# Patient Record
Sex: MALE | Race: BLACK OR AFRICAN AMERICAN | NOT HISPANIC OR LATINO | Employment: STUDENT | URBAN - METROPOLITAN AREA
[De-identification: names, ages, dates, MRNs, and addresses within clinical notes are randomized per-mention and may not be internally consistent; named-entity substitution may affect disease eponyms.]

---

## 2020-01-07 ENCOUNTER — TELEPHONE (OUTPATIENT)
Dept: FAMILY MEDICINE | Facility: CLINIC | Age: 20
End: 2020-01-07

## 2020-01-07 NOTE — TELEPHONE ENCOUNTER
----- Message from Hilda Fontenot sent at 1/7/2020  3:58 PM CST -----  Contact: Pt mother   Caller called in regards to scheduling a appointment. Pt can be reached at [phone] (home) . Caller also wanted to get the brother in as well.

## 2020-01-13 ENCOUNTER — OFFICE VISIT (OUTPATIENT)
Dept: FAMILY MEDICINE | Facility: CLINIC | Age: 20
End: 2020-01-13
Payer: MEDICAID

## 2020-01-13 ENCOUNTER — TELEPHONE (OUTPATIENT)
Dept: SPORTS MEDICINE | Facility: CLINIC | Age: 20
End: 2020-01-13

## 2020-01-13 VITALS
WEIGHT: 205.69 LBS | RESPIRATION RATE: 16 BRPM | HEART RATE: 82 BPM | BODY MASS INDEX: 27.26 KG/M2 | SYSTOLIC BLOOD PRESSURE: 112 MMHG | OXYGEN SATURATION: 99 % | DIASTOLIC BLOOD PRESSURE: 74 MMHG | HEIGHT: 73 IN | TEMPERATURE: 98 F

## 2020-01-13 DIAGNOSIS — Z23 NEED FOR INFLUENZA VACCINATION: ICD-10-CM

## 2020-01-13 DIAGNOSIS — S69.92XS FINGER INJURY, LEFT, SEQUELA: Primary | ICD-10-CM

## 2020-01-13 PROCEDURE — 99203 PR OFFICE/OUTPT VISIT, NEW, LEVL III, 30-44 MIN: ICD-10-PCS | Mod: 25,S$PBB,, | Performed by: FAMILY MEDICINE

## 2020-01-13 PROCEDURE — 99999 PR PBB SHADOW E&M-EST. PATIENT-LVL IV: ICD-10-PCS | Mod: PBBFAC,,, | Performed by: FAMILY MEDICINE

## 2020-01-13 PROCEDURE — 99214 OFFICE O/P EST MOD 30 MIN: CPT | Mod: PBBFAC,PO,25 | Performed by: FAMILY MEDICINE

## 2020-01-13 PROCEDURE — 99203 OFFICE O/P NEW LOW 30 MIN: CPT | Mod: 25,S$PBB,, | Performed by: FAMILY MEDICINE

## 2020-01-13 PROCEDURE — 90686 IIV4 VACC NO PRSV 0.5 ML IM: CPT | Mod: PBBFAC,PO

## 2020-01-13 PROCEDURE — 99999 PR PBB SHADOW E&M-EST. PATIENT-LVL IV: CPT | Mod: PBBFAC,,, | Performed by: FAMILY MEDICINE

## 2020-01-13 RX ORDER — MONTELUKAST SODIUM 10 MG/1
10 TABLET ORAL DAILY PRN
COMMUNITY
Start: 2019-10-21 | End: 2020-04-01 | Stop reason: SDUPTHER

## 2020-01-13 RX ORDER — CETIRIZINE HYDROCHLORIDE 10 MG/1
10 TABLET ORAL DAILY PRN
COMMUNITY
Start: 2019-10-17 | End: 2020-04-01 | Stop reason: SDUPTHER

## 2020-01-13 NOTE — TELEPHONE ENCOUNTER
Called in regards to ortho referral and spoke with pt's mother.  She scheduled apt and was informed to arrive 30 min early for x-ray.

## 2020-01-13 NOTE — PROGRESS NOTES
Samuel Adelso Espinal    Chief Complaint   Patient presents with    Establish Care    Finger Trauma       History of Present Illness:   Mr. Espinal comes in today as a new patient to establish care with me and for left finger injury evaluation.    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  He states in October 2019 while playing in a game he felt his left 5th finger pop with swelling.  He states he relocated his finger joint and taped it; however, he states he continues to have slight deformity of his finger and now has occasional pain with practicing ball. He denies numbness or tingling at his left hand. He states he did not have any medical attention for his finger injury until today.  He states he is right-handed.  He states he is scheduled to return to school on January 20, 2020.    Otherwise, he has no other problems today.    Past Medical History:  No date: Seasonal allergies      Current Outpatient Medications   Medication Sig    cetirizine (ZYRTEC) 10 MG tablet Take 10 mg by mouth daily as needed.     montelukast (SINGULAIR) 10 mg tablet Take 10 mg by mouth daily as needed.        Review of Systems   Constitutional: Negative for appetite change, chills, fatigue and fever.   Respiratory: Negative for cough, shortness of breath and wheezing.    Cardiovascular: Negative for chest pain, palpitations and leg swelling.   Gastrointestinal: Negative for abdominal pain, constipation, diarrhea, nausea and vomiting.   Genitourinary: Negative for difficulty urinating.   Musculoskeletal: Positive for joint swelling and myalgias. Negative for back pain.   Neurological: Negative for numbness and headaches.   Psychiatric/Behavioral: Negative for dysphoric mood and suicidal ideas. The patient is not nervous/anxious.         Negative for homicidal ideas.       Objective:  Physical Exam   Constitutional: He is oriented to person, place, and time. He appears well-developed and  well-nourished. No distress.   Pleasant.   Neck: Normal range of motion. Neck supple. No thyromegaly present.   Cardiovascular: Normal rate, regular rhythm, normal heart sounds and intact distal pulses.   No murmur heard.  Pulmonary/Chest: Effort normal and breath sounds normal. No stridor. No respiratory distress. He has no wheezes.   Abdominal: Soft. Bowel sounds are normal. He exhibits no distension and no mass. There is no tenderness. There is no guarding.   Musculoskeletal: Normal range of motion. He exhibits edema and deformity. He exhibits no tenderness.   He is ambulatory without problems.  Slightly swollen deformity at left 5th finger/PIP joint with full range of motion noted.   Lymphadenopathy:     He has no cervical adenopathy.   Neurological: He is alert and oriented to person, place, and time.   Skin: He is not diaphoretic.   Psychiatric: He has a normal mood and affect. His behavior is normal. Judgment and thought content normal.   Vitals reviewed.      ASSESSMENT:  1. Finger injury, left, sequela    2. Need for influenza vaccination        PLAN:  Samuel was seen today for establish care and finger trauma.    Diagnoses and all orders for this visit:    Finger injury, left, sequela  -     Ambulatory referral/consult to Orthopedics; Future    Need for influenza vaccination  -     Influenza - Quadrivalent (6 months+) (PF)    Continue current medications, follow low sodium, low cholesterol, low carb diet, daily walks.  Follow up if symptoms worsen or fail to improve.       Yes

## 2020-01-14 ENCOUNTER — OFFICE VISIT (OUTPATIENT)
Dept: ORTHOPEDICS | Facility: CLINIC | Age: 20
End: 2020-01-14
Payer: MEDICAID

## 2020-01-14 ENCOUNTER — HOSPITAL ENCOUNTER (OUTPATIENT)
Dept: RADIOLOGY | Facility: HOSPITAL | Age: 20
Discharge: HOME | End: 2020-01-14
Attending: ORTHOPAEDIC SURGERY
Payer: MEDICAID

## 2020-01-14 VITALS — HEIGHT: 73 IN | WEIGHT: 205 LBS | BODY MASS INDEX: 27.17 KG/M2

## 2020-01-14 DIAGNOSIS — M79.642 PAIN OF LEFT HAND: ICD-10-CM

## 2020-01-14 DIAGNOSIS — S69.92XS FINGER INJURY, LEFT, SEQUELA: ICD-10-CM

## 2020-01-14 DIAGNOSIS — M79.642 PAIN OF LEFT HAND: Primary | ICD-10-CM

## 2020-01-14 PROCEDURE — 73130 XR HAND COMPLETE 3 VIEW LEFT: ICD-10-PCS | Mod: 26,LT,, | Performed by: RADIOLOGY

## 2020-01-14 PROCEDURE — 99213 OFFICE O/P EST LOW 20 MIN: CPT | Mod: PBBFAC,25 | Performed by: ORTHOPAEDIC SURGERY

## 2020-01-14 PROCEDURE — 73130 X-RAY EXAM OF HAND: CPT | Mod: 26,LT,, | Performed by: RADIOLOGY

## 2020-01-14 PROCEDURE — 99203 PR OFFICE/OUTPT VISIT, NEW, LEVL III, 30-44 MIN: ICD-10-PCS | Mod: S$PBB,,, | Performed by: ORTHOPAEDIC SURGERY

## 2020-01-14 PROCEDURE — 99999 PR PBB SHADOW E&M-EST. PATIENT-LVL III: CPT | Mod: PBBFAC,,, | Performed by: ORTHOPAEDIC SURGERY

## 2020-01-14 PROCEDURE — 99999 PR PBB SHADOW E&M-EST. PATIENT-LVL III: ICD-10-PCS | Mod: PBBFAC,,, | Performed by: ORTHOPAEDIC SURGERY

## 2020-01-14 PROCEDURE — 99203 OFFICE O/P NEW LOW 30 MIN: CPT | Mod: S$PBB,,, | Performed by: ORTHOPAEDIC SURGERY

## 2020-01-14 PROCEDURE — 73130 X-RAY EXAM OF HAND: CPT | Mod: TC,LT

## 2020-01-14 NOTE — LETTER
January 14, 2020      Ebony Haile MD  [address]  [facility] [address]           [facility]  [address] [place] [address]  Phone: [phone]  Fax: [phone]          Patient: Samuel Espinal   MR Number: [id number]   YOB: 2000   Date of Visit: 1/14/2020       Dear Dr. Ebony Haile:    Thank you for referring Samuel Espinal to me for evaluation. Attached you will find relevant portions of my assessment and plan of care.    If you have questions, please do not hesitate to call me. I look forward to following Samuel Espinal along with you.    Sincerely,    Stevie Otto MD    Enclosure  CC:  No Recipients    If you would like to receive this communication electronically, please contact [email or web address] or [phone] to request more information on [facility] access.    For providers and/or their staff who would like to refer a patient to [facility], please contact us through our one-stop-shop provider referral line, [facility] , at [phone].    If you feel you have received this communication in error or would no longer like to receive these types of communications, please e-mail [email or web address]

## 2020-01-14 NOTE — PROGRESS NOTES
Subjective:     Patient ID: Samuel Espinal is a 19 y.o. male.    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  He goes to school in [place] and plays linebacker and is unavailable for follow-up.  He has not sought medical attention previously.      Past Medical History:   Diagnosis Date    Seasonal allergies      Past Surgical History:   Procedure Laterality Date    None       Family History   Problem Relation Age of Onset    No Known Problems Mother     Diabetes Father     No Known Problems Brother     No Known Problems Brother      Social History     Socioeconomic History    Marital status: Single     Spouse name: Not on file    Number of children: Not on file    Years of education: Not on file    Highest education level: Not on file   Occupational History    Not on file   Social Needs    Financial resource strain: Not on file    Food insecurity:     Worry: Not on file     Inability: Not on file    Transportation needs:     Medical: Not on file     Non-medical: Not on file   Tobacco Use    Smoking status: Never Smoker    Smokeless tobacco: Never Used   Substance and Sexual Activity    Alcohol use: Never     Frequency: Never    Drug use: Never    Sexual activity: Yes     Partners: Female     Birth control/protection: Condom   Lifestyle    Physical activity:     Days per week: Not on file     Minutes per session: Not on file    Stress: Not on file   Relationships    Social connections:     Talks on phone: Not on file     Gets together: Not on file     Attends Taoism service: Not on file     Active member of club or organization: Not on file     Attends meetings of clubs or organizations: Not on file     Relationship status: Not on file   Other Topics Concern    Not on file   Social History Narrative    Not on file     Medication List  with Changes/Refills   Current Medications    CETIRIZINE (ZYRTEC) 10 MG TABLET    Take 10 mg by mouth daily as needed.     MONTELUKAST (SINGULAIR) 10 MG TABLET    Take 10 mg by mouth daily as needed.      Review of patient's allergies indicates:  No Known Allergies  Review of Systems   Constitution: Negative for malaise/fatigue.   HENT: Negative for hearing loss.    Eyes: Negative for double vision and visual disturbance.   Cardiovascular: Negative for chest pain.   Respiratory: Negative for shortness of breath.    Endocrine: Negative for cold intolerance.   Hematologic/Lymphatic: Does not bruise/bleed easily.   Skin: Negative for poor wound healing and suspicious lesions.   Musculoskeletal: Negative for gout, joint pain and joint swelling.   Gastrointestinal: Negative for nausea and vomiting.   Genitourinary: Negative for dysuria.   Neurological: Negative for numbness, paresthesias and sensory change.   Psychiatric/Behavioral: Negative for depression, memory loss and substance abuse. The patient is not nervous/anxious.    Allergic/Immunologic: Negative for persistent infections.       Objective:   Body mass index is 27.05 kg/m².  There were no vitals filed for this visit.             General    Constitutional: He is oriented to person, place, and time. He appears well-developed and well-nourished. No distress.   HENT:   Head: Normocephalic.   Eyes: EOM are normal.   Neck: Normal range of motion.   Pulmonary/Chest: Effort normal.   Neurological: He is oriented to person, place, and time.   Psychiatric: He has a normal mood and affect.         Left Hand/Wrist Exam     Inspection   Scars: Hand -  absent  Effusion: Hand -  absent  Deformity: Hand -  present    Other     Sensory Exam  Median Distribution: normal  Ulnar Distribution: normal  Radial Distribution: normal    Comments:  The patient has -15 to 90° flexion left small finger PIP joint.  There is no instability on stressing the collateral ligaments are volar  plate.  There are no motor or sensory deficits.          Vascular Exam       Capillary Refill  Left Hand: normal capillary refill         radiographs left small finger were normal.  Assessment:     Encounter Diagnosis   Name Primary?    Finger injury, left, sequela     left small finger dislocation PIP joint, self reduced with residual flexion contracture PIP joint    Plan:       The patient was given a dynamic extensor assist splint.  He is going back to [place] and he needs to follow up there with physical therapy per his football team.  He will work on range-of-motion exercises.  He can return on a as needed basis to see me              Disclaimer: This note was prepared using a voice recognition system and is likely to have sound alike errors within the text.

## 2020-01-30 PROBLEM — S69.92XS FINGER INJURY, LEFT, SEQUELA: Status: ACTIVE | Noted: 2020-01-30

## 2020-04-01 ENCOUNTER — TELEPHONE (OUTPATIENT)
Dept: FAMILY MEDICINE | Facility: CLINIC | Age: 20
End: 2020-04-01

## 2020-04-01 RX ORDER — MONTELUKAST SODIUM 10 MG/1
10 TABLET ORAL DAILY PRN
Qty: 90 TABLET | Refills: 1 | Status: SHIPPED | OUTPATIENT
Start: 2020-04-01 | End: 2020-10-15 | Stop reason: SDUPTHER

## 2020-04-01 RX ORDER — CETIRIZINE HYDROCHLORIDE 10 MG/1
10 TABLET ORAL DAILY PRN
Qty: 90 TABLET | Refills: 1 | Status: SHIPPED | OUTPATIENT
Start: 2020-04-01 | End: 2020-10-15 | Stop reason: SDUPTHER

## 2020-04-01 RX ORDER — KETOTIFEN FUMARATE 0.35 MG/ML
SOLUTION/ DROPS OPHTHALMIC
Qty: 10 ML | Refills: 0 | COMMUNITY
Start: 2020-04-01 | End: 2020-04-02 | Stop reason: SDUPTHER

## 2020-04-01 RX ORDER — KETOTIFEN FUMARATE 0.35 MG/ML
SOLUTION/ DROPS OPHTHALMIC
COMMUNITY
Start: 2018-04-05 | End: 2020-04-01 | Stop reason: SDUPTHER

## 2020-04-02 RX ORDER — FLUTICASONE PROPIONATE 50 MCG
2 SPRAY, SUSPENSION (ML) NASAL DAILY PRN
Qty: 16 G | Refills: 2 | Status: SHIPPED | OUTPATIENT
Start: 2020-04-02 | End: 2020-10-15 | Stop reason: SDUPTHER

## 2020-04-02 RX ORDER — KETOTIFEN FUMARATE 0.35 MG/ML
SOLUTION/ DROPS OPHTHALMIC
Qty: 10 ML | Refills: 0 | Status: SHIPPED | OUTPATIENT
Start: 2020-04-02 | End: 2022-07-05

## 2020-04-02 NOTE — TELEPHONE ENCOUNTER
Eye drops was listed as OTC, patient mother wants rx.  Patient mother states Samuel also need Flonase

## 2020-04-02 NOTE — TELEPHONE ENCOUNTER
----- Message from Liz Shrestha sent at 4/2/2020  8:42 AM CDT -----  Type:  Needs Medical Advice    Who Called:  Pt Mom  (Deepti)  Symptoms (please be specific):    Calling regarding pt's prescriptions   How long has patient had these symptoms:     Pharmacy name and phone #:   [facility]//[address] in [place]  Would the patient rather a call back or a response via [facility]?   Call back  Best Call Back Number:    [phone]  Additional Information:  The pharmacy did not get the scripts for Flonase nasal spray and the eye drops//please call//thanks/esther

## 2020-06-25 ENCOUNTER — TELEPHONE (OUTPATIENT)
Dept: FAMILY MEDICINE | Facility: CLINIC | Age: 20
End: 2020-06-25

## 2020-06-25 NOTE — TELEPHONE ENCOUNTER
----- Message from Alirio Ackerman sent at 6/25/2020 11:49 AM CDT -----  Pt's mother would like to know date of last Tetanus shot. Please call back at [phone].  Md Ray

## 2020-06-25 NOTE — TELEPHONE ENCOUNTER
----- Message from Ebony Erwin sent at 6/25/2020  1:00 PM CDT -----  Regarding: returned call  Contact: Patients mother  Returned call, please call [phone]

## 2020-10-15 RX ORDER — CETIRIZINE HYDROCHLORIDE 10 MG/1
10 TABLET ORAL DAILY PRN
Qty: 90 TABLET | Refills: 1 | Status: SHIPPED | OUTPATIENT
Start: 2020-10-15 | End: 2020-12-15 | Stop reason: SDUPTHER

## 2020-10-15 RX ORDER — FLUTICASONE PROPIONATE 50 MCG
2 SPRAY, SUSPENSION (ML) NASAL DAILY PRN
Qty: 16 G | Refills: 2 | Status: SHIPPED | OUTPATIENT
Start: 2020-10-15 | End: 2020-12-15 | Stop reason: SDUPTHER

## 2020-10-15 RX ORDER — MONTELUKAST SODIUM 10 MG/1
10 TABLET ORAL DAILY PRN
Qty: 90 TABLET | Refills: 1 | Status: SHIPPED | OUTPATIENT
Start: 2020-10-15 | End: 2020-12-15 | Stop reason: SDUPTHER

## 2020-10-15 NOTE — TELEPHONE ENCOUNTER
----- Message from Selina Leon sent at 10/15/2020  8:38 AM CDT -----  Regarding: refill  Contact: brown gala / mother  Caller is requesting a call back regarding his medications refill.  Please call back at [phone].  Thanks.    [employer]  [facility]# [phone]

## 2020-10-15 NOTE — TELEPHONE ENCOUNTER
Patient mother states he is in school and need meds sent to [facility] in [place]. Pharmacy updated.

## 2020-11-24 ENCOUNTER — TELEPHONE (OUTPATIENT)
Dept: FAMILY MEDICINE | Facility: CLINIC | Age: 20
End: 2020-11-24

## 2020-11-24 NOTE — TELEPHONE ENCOUNTER
----- Message from Lesley Luong sent at 11/24/2020 10:10 AM CST -----  Regarding: Patient Call Back  Who Called: Mrs. Espinal (Mother)    What is the request in detail: Would like a call back in regards to finding out if her son has had a meningitis shot.     Can the clinic reply by  [facility]? No    Best Call Back Number: [phone]

## 2020-11-24 NOTE — TELEPHONE ENCOUNTER
Spoke with pt and mom, requesting vaccine record to be faxed over to the pt's school in [place] at(fax [phone], ph [phone]). Records faxed over.

## 2020-12-04 ENCOUNTER — TELEPHONE (OUTPATIENT)
Dept: FAMILY MEDICINE | Facility: CLINIC | Age: 20
End: 2020-12-04

## 2020-12-04 DIAGNOSIS — L73.8 FOLLICULITIS BARBAE: Primary | ICD-10-CM

## 2020-12-04 NOTE — TELEPHONE ENCOUNTER
Pt requesting referral for dermatologist  For hair under his neck (beared) is turn into sores and big bumps .

## 2020-12-04 NOTE — TELEPHONE ENCOUNTER
----- Message from Tiffany Perez sent at 12/4/2020  4:21 PM CST -----  Contact: Mother  Per the pt mother please send a referral to dermatologist Dr.Tara Dinero phone# [phone], no additional info given and can be reached at [phone]///thxMW

## 2020-12-09 ENCOUNTER — TELEPHONE (OUTPATIENT)
Dept: FAMILY MEDICINE | Facility: CLINIC | Age: 20
End: 2020-12-09

## 2020-12-09 NOTE — TELEPHONE ENCOUNTER
----- Message from Tiffany Perez sent at 12/9/2020  8:51 AM CST -----  Contact: pt  Request a call concerning a referral to dermatology for this pt, no additional info given and can be reached at  [phone]///thxMW

## 2020-12-09 NOTE — TELEPHONE ENCOUNTER
Patient asking for derm referral to dr Sherri read   At [facility]     For acne on neck around beard

## 2020-12-15 RX ORDER — CETIRIZINE HYDROCHLORIDE 10 MG/1
10 TABLET ORAL DAILY PRN
Qty: 90 TABLET | Refills: 0 | Status: SHIPPED | OUTPATIENT
Start: 2020-12-15

## 2020-12-15 RX ORDER — FLUTICASONE PROPIONATE 50 MCG
2 SPRAY, SUSPENSION (ML) NASAL DAILY PRN
Qty: 16 G | Refills: 2 | Status: SHIPPED | OUTPATIENT
Start: 2020-12-15

## 2020-12-15 RX ORDER — MONTELUKAST SODIUM 10 MG/1
10 TABLET ORAL DAILY PRN
Qty: 90 TABLET | Refills: 0 | Status: SHIPPED | OUTPATIENT
Start: 2020-12-15

## 2020-12-15 NOTE — TELEPHONE ENCOUNTER
----- Message from Leslie Darden sent at 12/15/2020  1:30 PM CST -----  Regarding: refill  Contact: Mother  What is the name of the medication you are requesting? Montelukast SOD  What is the dose? 10mg  How do you take the medication? Orally, Topically, etc?.  How often do you take this medication? Once at bedtime  Do you need a 30 day or 90 day supply?.  How many refills are you requesting?  What is your preferred pharmacy and location of pharmacy? [facility]  Who can we contact with further questions? Deepti [facility] [phone]    What is the name of the medication you are requesting? Cetirizine HCL  What is the dose? 10mg  How do you take the medication? Orally, Topically, etc?  How often do you take this medication? Once a day  Do you need a 30 day or 90 day supply?  How many refills are you requesting?  What is your preferred pharmacy and location of pharmacy? same  Who can we contact with further questions? [facility]    What is the name of the medication you are requesting? Nasal spray  What is the dose?  How do you take the medication? Orally, Topically, etc?  How often do you take this medication?  Do you need a 30 day or 90 day supply?  How many refills are you requesting?  What is your preferred pharmacy and location of pharmacy?  Who can we contact with further questions?

## 2021-07-28 ENCOUNTER — IMMUNIZATION (OUTPATIENT)
Dept: PHARMACY | Facility: CLINIC | Age: 21
End: 2021-07-28
Payer: MEDICAID

## 2021-07-28 DIAGNOSIS — Z23 NEED FOR VACCINATION: Primary | ICD-10-CM

## 2022-05-26 ENCOUNTER — PATIENT OUTREACH (OUTPATIENT)
Dept: ADMINISTRATIVE | Facility: OTHER | Age: 22
End: 2022-05-26
Payer: MEDICAID

## 2022-06-29 ENCOUNTER — TELEPHONE (OUTPATIENT)
Dept: FAMILY MEDICINE | Facility: CLINIC | Age: 22
End: 2022-06-29
Payer: MEDICAID

## 2022-06-29 NOTE — TELEPHONE ENCOUNTER
----- Message from Alison Justin sent at 6/27/2022 11:32 AM CDT -----  Contact: mother (Deepti)-[phone]  Type:  Same Day Appointment Request    Caller is requesting a same day appointment.  Caller declined first available appointment listed below.    Name of Caller:mother  When is the first available appointment?2022  Symptoms:stomp his toe  Best Call Back Number:[phone]  Additional Information:

## 2022-07-05 ENCOUNTER — OFFICE VISIT (OUTPATIENT)
Dept: FAMILY MEDICINE | Facility: CLINIC | Age: 22
End: 2022-07-05
Payer: MEDICAID

## 2022-07-05 VITALS
SYSTOLIC BLOOD PRESSURE: 116 MMHG | HEIGHT: 73 IN | TEMPERATURE: 96 F | BODY MASS INDEX: 26.97 KG/M2 | OXYGEN SATURATION: 98 % | HEART RATE: 69 BPM | WEIGHT: 203.5 LBS | DIASTOLIC BLOOD PRESSURE: 70 MMHG

## 2022-07-05 DIAGNOSIS — L30.9 DERMATITIS OF BOTH FEET: Primary | ICD-10-CM

## 2022-07-05 PROCEDURE — 3074F PR MOST RECENT SYSTOLIC BLOOD PRESSURE < 130 MM HG: ICD-10-PCS | Mod: CPTII,,, | Performed by: FAMILY MEDICINE

## 2022-07-05 PROCEDURE — 99214 OFFICE O/P EST MOD 30 MIN: CPT | Mod: PBBFAC,PO | Performed by: FAMILY MEDICINE

## 2022-07-05 PROCEDURE — 99213 OFFICE O/P EST LOW 20 MIN: CPT | Mod: S$PBB,,, | Performed by: FAMILY MEDICINE

## 2022-07-05 PROCEDURE — 1159F MED LIST DOCD IN RCRD: CPT | Mod: CPTII,,, | Performed by: FAMILY MEDICINE

## 2022-07-05 PROCEDURE — 99999 PR PBB SHADOW E&M-EST. PATIENT-LVL IV: ICD-10-PCS | Mod: PBBFAC,,, | Performed by: FAMILY MEDICINE

## 2022-07-05 PROCEDURE — 3078F PR MOST RECENT DIASTOLIC BLOOD PRESSURE < 80 MM HG: ICD-10-PCS | Mod: CPTII,,, | Performed by: FAMILY MEDICINE

## 2022-07-05 PROCEDURE — 1159F PR MEDICATION LIST DOCUMENTED IN MEDICAL RECORD: ICD-10-PCS | Mod: CPTII,,, | Performed by: FAMILY MEDICINE

## 2022-07-05 PROCEDURE — 3074F SYST BP LT 130 MM HG: CPT | Mod: CPTII,,, | Performed by: FAMILY MEDICINE

## 2022-07-05 PROCEDURE — 3078F DIAST BP <80 MM HG: CPT | Mod: CPTII,,, | Performed by: FAMILY MEDICINE

## 2022-07-05 PROCEDURE — 99213 PR OFFICE/OUTPT VISIT, EST, LEVL III, 20-29 MIN: ICD-10-PCS | Mod: S$PBB,,, | Performed by: FAMILY MEDICINE

## 2022-07-05 PROCEDURE — 3008F BODY MASS INDEX DOCD: CPT | Mod: CPTII,,, | Performed by: FAMILY MEDICINE

## 2022-07-05 PROCEDURE — 3008F PR BODY MASS INDEX (BMI) DOCUMENTED: ICD-10-PCS | Mod: CPTII,,, | Performed by: FAMILY MEDICINE

## 2022-07-05 PROCEDURE — 99999 PR PBB SHADOW E&M-EST. PATIENT-LVL IV: CPT | Mod: PBBFAC,,, | Performed by: FAMILY MEDICINE

## 2022-07-05 NOTE — PROGRESS NOTES
Samuel Adelso Espinal    Chief Complaint   Patient presents with    Foot Pain       History of Present Illness:   Mr. Espinal comes in today with complaint of having feet problems and requests referral to see podiatrist Dr. Miki Pabon.  He states he works out and runs a lot with subsequent feet soreness/pain along with blisters and skin dryness at his feet.    Otherwise, he denies having fever, chills, fatigue, appetite changes; shortness of breath, cough, wheezing; chest pain, palpitations, leg swelling abdominal pain, nausea, vomiting, diarrhea, constipation; unusual urinary symptoms; polydipsia, polyphagia, polyuria, hot or cold intolerance; back pain; headache, numbness; anxiety, depression, homicidal or suicidal thoughts.          Current Outpatient Medications   Medication Sig    cetirizine (ZYRTEC) 10 MG tablet Take 1 tablet (10 mg total) by mouth daily as needed.    fluticasone propionate (FLONASE) 50 mcg/actuation nasal spray 2 sprays (100 mcg total) by Each Nostril route daily as needed for Rhinitis.    montelukast (SINGULAIR) 10 mg tablet Take 1 tablet (10 mg total) by mouth daily as needed.       Review of Systems   Constitutional: Negative for appetite change, chills, fatigue and fever.   Respiratory: Negative for cough, shortness of breath and wheezing.    Cardiovascular: Negative for chest pain, palpitations and leg swelling.   Gastrointestinal: Negative for constipation, diarrhea, nausea and vomiting.   Endocrine: Negative for cold intolerance, heat intolerance, polydipsia, polyphagia and polyuria.   Genitourinary: Negative for difficulty urinating.   Musculoskeletal: Positive for myalgias. Negative for arthralgias and back pain.   Skin:        See history of present illness.   Neurological: Negative for numbness and headaches.   Psychiatric/Behavioral: Negative for dysphoric mood and suicidal ideas. The patient is not nervous/anxious.         Negative for homicidal ideas.        Objective:  Physical Exam  Vitals reviewed.   Constitutional:       General: He is not in acute distress.     Appearance: Normal appearance. He is not ill-appearing, toxic-appearing or diaphoretic.      Comments: Pleasant.   Cardiovascular:      Rate and Rhythm: Normal rate and regular rhythm.      Pulses:           Dorsalis pedis pulses are 3+ on the right side and 3+ on the left side.      Heart sounds: No murmur heard.  Pulmonary:      Effort: Pulmonary effort is normal. No respiratory distress.      Breath sounds: Normal breath sounds. No wheezing.   Abdominal:      General: Bowel sounds are normal. There is no distension.      Palpations: Abdomen is soft. There is no mass.      Tenderness: There is no abdominal tenderness. There is no guarding or rebound.   Musculoskeletal:         General: No swelling or tenderness. Normal range of motion.      Cervical back: Normal range of motion and neck supple. No tenderness.      Comments: He is ambulatory without problems.   Feet:      Right foot:      Protective Sensation: 5 sites tested. 5 sites sensed.      Skin integrity: Blister and dry skin present.      Toenail Condition: Right toenails are abnormally thick.      Left foot:      Protective Sensation: 5 sites tested. 5 sites sensed.      Skin integrity: Blister and dry skin present.      Comments: Thickened nail at right great toe. Dried skin and dried blisters at bottom of feet.  Lymphadenopathy:      Cervical: No cervical adenopathy.   Skin:     General: Skin is warm.   Neurological:      General: No focal deficit present.      Mental Status: He is alert and oriented to person, place, and time.   Psychiatric:         Mood and Affect: Mood normal.         Behavior: Behavior normal.         Thought Content: Thought content normal.         Judgment: Judgment normal.         ASSESSMENT:  1. Dermatitis of both feet        PLAN:  Samuel was seen today for foot pain.    Diagnoses and all orders for this  visit:    Dermatitis of both feet  -     Ambulatory referral/consult to Podiatry; Future       Continue current medications, follow low sodium, low cholesterol, low carb diet, daily walks.  Keep follow up with specialists.  Flu shot this fall.  Follow up if symptoms worsen or fail to improve.

## 2022-07-15 ENCOUNTER — TELEPHONE (OUTPATIENT)
Dept: FAMILY MEDICINE | Facility: CLINIC | Age: 22
End: 2022-07-15
Payer: MEDICAID

## 2022-07-15 NOTE — TELEPHONE ENCOUNTER
----- Message from Anna Gorman sent at 7/15/2022 10:30 AM CDT -----  Regarding: Immunization records  Contact: Patient & Mother  Please call patient concerning getting a copy of his immunization records emailed to him at  [phone] or [phone] ( )

## 2023-01-03 ENCOUNTER — TELEPHONE (OUTPATIENT)
Dept: FAMILY MEDICINE | Facility: CLINIC | Age: 23
End: 2023-01-03
Payer: MEDICAID

## 2023-01-03 NOTE — TELEPHONE ENCOUNTER
----- Message from Tristian Joe sent at 1/3/2023  9:43 AM CST -----  Contact: Mom  ..Type:  Sooner Apoointment Request    Caller is requesting a sooner appointment.  Caller declined first available appointment listed below.  Caller will not accept being placed on the waitlist and is requesting a message be sent to doctor.  Name of Caller:.Samuel Espinal  When is the first available appointment?  Symptoms: issues/concern   Would the patient rather a call back or a response via [facility]? Call back   Best Call Back Number: .[phone] (home)   Additional Information:

## 2023-01-25 ENCOUNTER — TELEPHONE (OUTPATIENT)
Dept: FAMILY MEDICINE | Facility: CLINIC | Age: 23
End: 2023-01-25
Payer: MEDICAID

## 2023-01-25 NOTE — TELEPHONE ENCOUNTER
----- Message from Dustin Sy sent at 1/25/2023  8:04 AM CST -----  Contact: Deepti/mom  Deepti is requesting a call to schedule an appt. Please call her back at [phone].          Thanks  DD

## 2023-01-25 NOTE — TELEPHONE ENCOUNTER
"Pt moms states he is complaining of issues "down there" and needs to get an appt. He is medicaid and I cant schedule it   "

## 2023-01-27 ENCOUNTER — TELEPHONE (OUTPATIENT)
Dept: FAMILY MEDICINE | Facility: CLINIC | Age: 23
End: 2023-01-27

## 2023-01-27 NOTE — TELEPHONE ENCOUNTER
----- Message from Shelby Griggs sent at 1/27/2023  8:36 AM CST -----  Contact: Deepti/Mom  Type:  Sooner Apoointment Request    Caller is requesting a sooner appointment. Caller will not accept being placed on the waitlist and is requesting a message be sent to doctor.  Name of Caller:Deepti  When is the first available appointment?unknown  Symptoms:problems with private area  Would the patient rather a call back or a response via [facility]? call  Best Call Back Number:[phone]    Additional Information: Patient's mom reports not yet receiving a response regarding scheduling the patient's appointment. Ms. Tatum request to schedule the patient an appointment sooner than next available.   Thank you,  GH

## 2023-01-27 NOTE — TELEPHONE ENCOUNTER
Called pt. Mother states she is at work currently and unable to get here. Wants to know if it's possible to fit him in on a Tuesday or Wednesday.

## 2023-02-20 ENCOUNTER — OFFICE VISIT (OUTPATIENT)
Dept: FAMILY MEDICINE | Facility: CLINIC | Age: 23
End: 2023-02-20
Payer: MEDICAID

## 2023-02-20 VITALS
TEMPERATURE: 99 F | WEIGHT: 213.38 LBS | RESPIRATION RATE: 18 BRPM | DIASTOLIC BLOOD PRESSURE: 78 MMHG | BODY MASS INDEX: 28.28 KG/M2 | HEIGHT: 73 IN | HEART RATE: 72 BPM | SYSTOLIC BLOOD PRESSURE: 134 MMHG | OXYGEN SATURATION: 98 %

## 2023-02-20 DIAGNOSIS — B35.6 TINEA CRURIS: Primary | ICD-10-CM

## 2023-02-20 DIAGNOSIS — L73.8 FOLLICULITIS BARBAE: ICD-10-CM

## 2023-02-20 DIAGNOSIS — E66.3 OVERWEIGHT (BMI 25.0-29.9): ICD-10-CM

## 2023-02-20 PROCEDURE — 3075F PR MOST RECENT SYSTOLIC BLOOD PRESS GE 130-139MM HG: ICD-10-PCS | Mod: CPTII,,, | Performed by: FAMILY MEDICINE

## 2023-02-20 PROCEDURE — 3078F DIAST BP <80 MM HG: CPT | Mod: CPTII,,, | Performed by: FAMILY MEDICINE

## 2023-02-20 PROCEDURE — 99999 PR PBB SHADOW E&M-EST. PATIENT-LVL III: CPT | Mod: PBBFAC,,, | Performed by: FAMILY MEDICINE

## 2023-02-20 PROCEDURE — 3008F BODY MASS INDEX DOCD: CPT | Mod: CPTII,,, | Performed by: FAMILY MEDICINE

## 2023-02-20 PROCEDURE — 99999 PR PBB SHADOW E&M-EST. PATIENT-LVL III: ICD-10-PCS | Mod: PBBFAC,,, | Performed by: FAMILY MEDICINE

## 2023-02-20 PROCEDURE — 1160F RVW MEDS BY RX/DR IN RCRD: CPT | Mod: CPTII,,, | Performed by: FAMILY MEDICINE

## 2023-02-20 PROCEDURE — 99213 PR OFFICE/OUTPT VISIT, EST, LEVL III, 20-29 MIN: ICD-10-PCS | Mod: S$PBB,,, | Performed by: FAMILY MEDICINE

## 2023-02-20 PROCEDURE — 99213 OFFICE O/P EST LOW 20 MIN: CPT | Mod: S$PBB,,, | Performed by: FAMILY MEDICINE

## 2023-02-20 PROCEDURE — 3075F SYST BP GE 130 - 139MM HG: CPT | Mod: CPTII,,, | Performed by: FAMILY MEDICINE

## 2023-02-20 PROCEDURE — 99213 OFFICE O/P EST LOW 20 MIN: CPT | Mod: PBBFAC,PO | Performed by: FAMILY MEDICINE

## 2023-02-20 PROCEDURE — 1160F PR REVIEW ALL MEDS BY PRESCRIBER/CLIN PHARMACIST DOCUMENTED: ICD-10-PCS | Mod: CPTII,,, | Performed by: FAMILY MEDICINE

## 2023-02-20 PROCEDURE — 3078F PR MOST RECENT DIASTOLIC BLOOD PRESSURE < 80 MM HG: ICD-10-PCS | Mod: CPTII,,, | Performed by: FAMILY MEDICINE

## 2023-02-20 PROCEDURE — 1159F PR MEDICATION LIST DOCUMENTED IN MEDICAL RECORD: ICD-10-PCS | Mod: CPTII,,, | Performed by: FAMILY MEDICINE

## 2023-02-20 PROCEDURE — 3008F PR BODY MASS INDEX (BMI) DOCUMENTED: ICD-10-PCS | Mod: CPTII,,, | Performed by: FAMILY MEDICINE

## 2023-02-20 PROCEDURE — 1159F MED LIST DOCD IN RCRD: CPT | Mod: CPTII,,, | Performed by: FAMILY MEDICINE

## 2023-02-20 RX ORDER — CLOTRIMAZOLE AND BETAMETHASONE DIPROPIONATE 10; .64 MG/G; MG/G
CREAM TOPICAL
Qty: 45 G | Refills: 0 | Status: SHIPPED | OUTPATIENT
Start: 2023-02-20

## 2023-02-20 NOTE — PROGRESS NOTES
Samuel Adelso Espinal    Chief Complaint   Patient presents with    Rash       History of Present Illness:   Mr. Espinal comes in today with complaint of having intermittent itchy rash in his genital area for approximately 6 months.  He states he is currently not sexually active and states rash did not start during time of sexual activity.    He also complains of having bumps in his chin area.  He states he shaves.  He states he desires to see Dermatology.    He states he occasionally drinks alcohol but denies tobacco or illicit drug use.    He denies having fever, chills, fatigue, appetite changes; shortness of breath, cough, wheezing; chest pain, palpitations, leg swelling; abdominal pain, nausea, vomiting, diarrhea, constipation; unusual urinary symptoms; polydipsia, polyphagia, polyuria, hot or cold intolerance; back pain; headache; anxiety, depression, homicidal or suicidal thoughts.             Current Outpatient Medications   Medication Sig    cetirizine (ZYRTEC) 10 MG tablet Take 1 tablet (10 mg total) by mouth daily as needed.    fluticasone propionate (FLONASE) 50 mcg/actuation nasal spray 2 sprays (100 mcg total) by Each Nostril route daily as needed for Rhinitis.    montelukast (SINGULAIR) 10 mg tablet Take 1 tablet (10 mg total) by mouth daily as needed.         Review of Systems   Constitutional:  Negative for appetite change, chills, fatigue and fever.   Respiratory:  Negative for cough, shortness of breath and wheezing.    Cardiovascular:  Negative for chest pain, palpitations and leg swelling.   Gastrointestinal:  Negative for constipation, diarrhea, nausea and vomiting.   Endocrine: Negative for cold intolerance, heat intolerance, polydipsia, polyphagia and polyuria.   Genitourinary:  Negative for difficulty urinating.   Musculoskeletal:  Negative for arthralgias, back pain and myalgias.   Skin:  Positive for rash.        See history of present illness.   Neurological:  Negative for numbness and  headaches.   Psychiatric/Behavioral:  Negative for dysphoric mood and suicidal ideas. The patient is not nervous/anxious.         Negative for homicidal ideas.     Objective:  Physical Exam  Vitals reviewed.   Constitutional:       General: He is not in acute distress.     Appearance: Normal appearance. He is not ill-appearing, toxic-appearing or diaphoretic.      Comments: Pleasant.   Cardiovascular:      Rate and Rhythm: Normal rate and regular rhythm.      Pulses: Normal pulses.      Heart sounds: No murmur heard.  Pulmonary:      Effort: Pulmonary effort is normal. No respiratory distress.      Breath sounds: Normal breath sounds. No wheezing.   Abdominal:      General: Bowel sounds are normal. There is no distension.      Palpations: Abdomen is soft. There is no mass.      Tenderness: There is no abdominal tenderness. There is no guarding or rebound.   Musculoskeletal:         General: No swelling or tenderness. Normal range of motion.      Cervical back: Normal range of motion and neck supple. No tenderness.      Comments: He is ambulatory without problems.   Lymphadenopathy:      Cervical: No cervical adenopathy.   Skin:     General: Skin is warm.      Comments: Small healing follicular bumps at right chin with beard noted.    Healing follicular areas at left groin/scrotal areas.   Neurological:      General: No focal deficit present.      Mental Status: He is alert and oriented to person, place, and time.   Psychiatric:         Mood and Affect: Mood normal.         Behavior: Behavior normal.         Thought Content: Thought content normal.         Judgment: Judgment normal.       ASSESSMENT:  1. Tinea cruris    2. Folliculitis barbae    3. Overweight (BMI 25.0-29.9)        PLAN:  Samuel was seen today for rash.    Diagnoses and all orders for this visit:    Tinea cruris  -     clotrimazole-betamethasone 1-0.05% (LOTRISONE) cream; Apply twice daily for 2 weeks as directed    Folliculitis barbae  -      Ambulatory referral/consult to Dermatology; Future    Overweight (BMI 25.0-29.9)      Okay to use/apply Lotrisone twice daily for 2 weeks at at time as directed; medication precautions discussed with patient.  Continue current medications, follow low sodium, low cholesterol, low carb diet, daily walks.  Keep follow up with specialists.  Follow up if symptoms worsen or fail to improve.

## 2023-07-11 ENCOUNTER — PATIENT MESSAGE (OUTPATIENT)
Dept: RESEARCH | Facility: HOSPITAL | Age: 23
End: 2023-07-11
Payer: MEDICAID